# Patient Record
Sex: MALE | Race: BLACK OR AFRICAN AMERICAN | URBAN - METROPOLITAN AREA
[De-identification: names, ages, dates, MRNs, and addresses within clinical notes are randomized per-mention and may not be internally consistent; named-entity substitution may affect disease eponyms.]

---

## 2020-11-20 ENCOUNTER — TELEPHONE (OUTPATIENT)
Dept: INTERNAL MEDICINE UNIT | Facility: HOSPITAL | Age: 30
End: 2020-11-20

## 2020-11-20 NOTE — TELEPHONE ENCOUNTER
Order received from [employer] to [place] hospitalist office. Pt has never been under the care of hospitalist MD. Call made to [facility] to notify.